# Patient Record
Sex: MALE | Race: WHITE | NOT HISPANIC OR LATINO | URBAN - METROPOLITAN AREA
[De-identification: names, ages, dates, MRNs, and addresses within clinical notes are randomized per-mention and may not be internally consistent; named-entity substitution may affect disease eponyms.]

---

## 2019-12-12 ENCOUNTER — EMERGENCY (EMERGENCY)
Facility: HOSPITAL | Age: 54
LOS: 1 days | Discharge: HOME | End: 2019-12-12
Admitting: EMERGENCY MEDICINE
Payer: COMMERCIAL

## 2019-12-12 VITALS
TEMPERATURE: 97 F | HEART RATE: 91 BPM | WEIGHT: 210.1 LBS | SYSTOLIC BLOOD PRESSURE: 182 MMHG | RESPIRATION RATE: 16 BRPM | DIASTOLIC BLOOD PRESSURE: 104 MMHG | HEIGHT: 73 IN | OXYGEN SATURATION: 98 %

## 2019-12-12 VITALS
OXYGEN SATURATION: 98 % | SYSTOLIC BLOOD PRESSURE: 165 MMHG | DIASTOLIC BLOOD PRESSURE: 97 MMHG | RESPIRATION RATE: 18 BRPM | TEMPERATURE: 98 F | HEART RATE: 89 BPM

## 2019-12-12 PROCEDURE — 99284 EMERGENCY DEPT VISIT MOD MDM: CPT

## 2019-12-12 PROCEDURE — 93971 EXTREMITY STUDY: CPT | Mod: 26,LT

## 2019-12-12 NOTE — ED ADULT NURSE NOTE - NSIMPLEMENTINTERV_GEN_ALL_ED
Implemented All Universal Safety Interventions:  [place] to call system. Call bell, personal items and telephone within reach. Instruct patient to call for assistance. Room bathroom lighting operational. Non-slip footwear when patient is off stretcher. Physically safe environment: no spills, clutter or unnecessary equipment. Stretcher in lowest position, wheels locked, appropriate side rails in place.

## 2019-12-12 NOTE — ED PROVIDER NOTE - CLINICAL SUMMARY MEDICAL DECISION MAKING FREE TEXT BOX
left baker's cyst, no signs of infection or abscess, no DVT, nvi, ambulatory, advised RICE, f/u ortho, return precautions discussed.

## 2019-12-12 NOTE — ED PROVIDER NOTE - PATIENT PORTAL LINK FT
You can access the FollowMyHealth Patient Portal offered by [facility] by registering at the following website: [email or web address]. By joining [employer]’s FollowMyHealth portal, you will also be able to view your health information using other applications (apps) compatible with our system.

## 2019-12-12 NOTE — ED PROVIDER NOTE - OBJECTIVE STATEMENT
53 y/o M with PMHx HTN, HIV, left side neuropathy, and receiving weekly testosterone injections presents to ED c/o a bump on the back of his knee which he noticed Tuesday. He notes that the bump is only mildly painful and has some numbness. Pt went to [facility] earlier today who sent him here for an ultrasound. Denies trauma or injury. Pt travels by airplane frequently and got back last night from a trip. He has had a blood clot in his right knee previously from a bunion removal. Denies fever, chills, N/V, chest pain, SOB, or gait abnormalities. 53 y/o M with PMHx HTN, HIV, left side neuropathy, and receiving weekly testosterone injections presents to ED c/o a bump on the back of his left knee which he noticed a few days ago. Pt went to [facility] earlier today who sent him here for an ultrasound to r/o DVT. Denies trauma or injury. Pt travels by airplane frequently and got back last night from a trip. He has had a blood clot in his right leg previously presumed from   a bunion removal. Denies fever, chills, N/V, chest pain, SOB. ambulating without difficulty.

## 2019-12-12 NOTE — ED PROVIDER NOTE - PHYSICAL EXAMINATION
VITAL SIGNS: I have reviewed nursing notes and confirm.  CONSTITUTIONAL: Well-developed; well-nourished; in no acute distress.  SKIN: Skin is warm and dry, no acute rash.  HEAD: Normocephalic; atraumatic.  EYES: PERRL, EOM intact; conjunctiva and sclera clear.  ENT: No nasal discharge; airway clear.  NECK: Supple; non tender.  CARD: S1, S2 normal; no murmurs, gallops, or rubs. Regular rate and rhythm.  RESP: No wheezes, rales or rhonchi.  NEURO: Alert, oriented. Grossly unremarkable.  PSYCH: Cooperative, appropriate.   RLE: + posterior left knee swelling with tenderness to palpation. No fluctuance, good ROM. VITAL SIGNS: I have reviewed nursing notes and confirm.  CONSTITUTIONAL: Well-developed; well-nourished; in no acute distress.  SKIN: Skin is warm and dry, no acute rash.  HEAD: Normocephalic; atraumatic.  EYES: clear bilaterally  ENT: No nasal discharge; airway clear.  NECK: Supple; non tender.  CARD: S1, S2 normal; no murmurs, gallops, or rubs. Regular rate and rhythm.  RESP: No wheezes, rales or rhonchi.  NEURO: Alert, oriented. Grossly unremarkable.  PSYCH: Cooperative, appropriate.   RLE: + posterior left knee swelling with tenderness to palpation. No fluctuance or erythema, no drainage, good ROM. no calf swelling or tenderness. distal pulses intact, full ROM joints, ambulatory

## 2019-12-12 NOTE — ED PROVIDER NOTE - CARE PROVIDER_API CALL
Brandon Stafford)  Orthopaedic Surgery Surgery  [address] [place] [address]  Phone: [phone]  Fax: [phone]  Follow Up Time:

## 2019-12-21 DIAGNOSIS — M25.562 PAIN IN LEFT KNEE: ICD-10-CM

## 2019-12-21 DIAGNOSIS — R20.0 ANESTHESIA OF SKIN: ICD-10-CM

## 2019-12-21 DIAGNOSIS — M71.22 SYNOVIAL CYST OF POPLITEAL SPACE [BAKER], LEFT KNEE: ICD-10-CM

## 2024-03-15 NOTE — ED PROVIDER NOTE - NSFOLLOWUPINSTRUCTIONS_ED_ALL_ED_FT
Medications
A Bakers cyst, or popliteal cyst, is a bulging lump behind your knee. Inside the lump is a sac filled with fluid. The cyst is caused by fluid buildup in your knee joint. This can happen if you have a knee injury, such as a cartilage tear. Osteoarthritis or rheumatoid arthritis can also cause an abnormal buildup of joint fluid.     DISCHARGE INSTRUCTIONS:    Return to the emergency department if:     You have severe pain.      You have bruising on the ankle below the cyst.      Your calf turns blue below the cyst.      Your calf or knee is swollen or bleeding.    Contact your healthcare provider if:     You have a fever.      Your pain does not improve with medicine.      You have questions or concerns about your condition or care.    Medicines:     NSAIDs help decrease swelling and pain. This medicine is available without a doctor's order. Your healthcare provider will tell you which medicine to take and how often to take it. Follow directions. NSAIDs can cause stomach bleeding or kidney problems if they are not taken correctly.      Take your medicine as directed. Contact your healthcare provider if you think your medicine is not helping or if you have side effects. Tell him of her if you are allergic to any medicine. Keep a list of the medicines, vitamins, and herbs you take. Include the amounts, and when and why you take them. Bring the list or the pill bottles to follow-up visits. Carry your medicine list with you in case of an emergency.    Care for your knee:     Rest as needed. Limit movement as your knee heals. This will help decrease the risk of more damage to your knee. You may need crutches to take weight off your injured knee. Use crutches as directed.      Ice your knee. Ice helps decrease swelling and pain. Use an ice pack, or put ice in a plastic bag. Cover the ice pack with a towel and place the ice on your knee for 15 to 20 minutes, 3 to 4 times each day. Do this for 2 to 3 days.      Support your knee. Wrap your knee with an elastic bandage. Ask your healthcare provider if you need a brace for more support. This will help decrease swelling and movement so your knee can heal.      Elevate your knee. Use pillows to raise your knee above the level of your heart as often as you can. This will help decrease swelling.      Go to physical therapy as directed. A physical therapist teaches you exercises to help improve movement and strength, and to decrease pain.     Follow up with your healthcare provider as directed: Write down your questions so you remember to ask them during your visits.
